# Patient Record
Sex: MALE | HISPANIC OR LATINO | Employment: FULL TIME | URBAN - METROPOLITAN AREA
[De-identification: names, ages, dates, MRNs, and addresses within clinical notes are randomized per-mention and may not be internally consistent; named-entity substitution may affect disease eponyms.]

---

## 2017-05-13 ENCOUNTER — HOSPITAL ENCOUNTER (OUTPATIENT)
Dept: LAB | Facility: MEDICAL CENTER | Age: 57
End: 2017-05-13
Attending: INTERNAL MEDICINE
Payer: COMMERCIAL

## 2017-05-13 DIAGNOSIS — Z13.220 SCREENING, LIPID: ICD-10-CM

## 2017-05-13 DIAGNOSIS — R73.01 ELEVATED FASTING GLUCOSE: ICD-10-CM

## 2017-05-13 LAB
ALBUMIN SERPL BCP-MCNC: 4.5 G/DL (ref 3.2–4.9)
ALBUMIN/GLOB SERPL: 1.5 G/DL
ALP SERPL-CCNC: 62 U/L (ref 30–99)
ALT SERPL-CCNC: 79 U/L (ref 2–50)
ANION GAP SERPL CALC-SCNC: 7 MMOL/L (ref 0–11.9)
AST SERPL-CCNC: 33 U/L (ref 12–45)
BILIRUB SERPL-MCNC: 0.8 MG/DL (ref 0.1–1.5)
BUN SERPL-MCNC: 14 MG/DL (ref 8–22)
CALCIUM SERPL-MCNC: 9.8 MG/DL (ref 8.5–10.5)
CHLORIDE SERPL-SCNC: 104 MMOL/L (ref 96–112)
CHOLEST SERPL-MCNC: 100 MG/DL (ref 100–199)
CO2 SERPL-SCNC: 25 MMOL/L (ref 20–33)
CREAT SERPL-MCNC: 0.89 MG/DL (ref 0.5–1.4)
EST. AVERAGE GLUCOSE BLD GHB EST-MCNC: 123 MG/DL
GFR SERPL CREATININE-BSD FRML MDRD: >60 ML/MIN/1.73 M 2
GLOBULIN SER CALC-MCNC: 3.1 G/DL (ref 1.9–3.5)
GLUCOSE SERPL-MCNC: 128 MG/DL (ref 65–99)
HBA1C MFR BLD: 5.9 % (ref 0–5.6)
HDLC SERPL-MCNC: 55 MG/DL
LDLC SERPL CALC-MCNC: 38 MG/DL
POTASSIUM SERPL-SCNC: 4 MMOL/L (ref 3.6–5.5)
PROT SERPL-MCNC: 7.6 G/DL (ref 6–8.2)
SODIUM SERPL-SCNC: 136 MMOL/L (ref 135–145)
TRIGL SERPL-MCNC: 35 MG/DL (ref 0–149)

## 2017-05-13 PROCEDURE — 80053 COMPREHEN METABOLIC PANEL: CPT

## 2017-05-13 PROCEDURE — 83036 HEMOGLOBIN GLYCOSYLATED A1C: CPT

## 2017-05-13 PROCEDURE — 80061 LIPID PANEL: CPT

## 2017-05-13 PROCEDURE — 36415 COLL VENOUS BLD VENIPUNCTURE: CPT

## 2018-05-23 ENCOUNTER — OFFICE VISIT (OUTPATIENT)
Dept: MEDICAL GROUP | Facility: MEDICAL CENTER | Age: 58
End: 2018-05-23
Payer: COMMERCIAL

## 2018-05-23 VITALS
TEMPERATURE: 98.6 F | RESPIRATION RATE: 16 BRPM | OXYGEN SATURATION: 94 % | BODY MASS INDEX: 33.33 KG/M2 | SYSTOLIC BLOOD PRESSURE: 116 MMHG | WEIGHT: 225 LBS | HEART RATE: 89 BPM | DIASTOLIC BLOOD PRESSURE: 70 MMHG | HEIGHT: 69 IN

## 2018-05-23 DIAGNOSIS — K75.81 NASH (NONALCOHOLIC STEATOHEPATITIS): ICD-10-CM

## 2018-05-23 DIAGNOSIS — R73.02 IGT (IMPAIRED GLUCOSE TOLERANCE): ICD-10-CM

## 2018-05-23 PROCEDURE — 99214 OFFICE O/P EST MOD 30 MIN: CPT | Performed by: INTERNAL MEDICINE

## 2018-05-23 ASSESSMENT — PATIENT HEALTH QUESTIONNAIRE - PHQ9: CLINICAL INTERPRETATION OF PHQ2 SCORE: 0

## 2018-05-24 NOTE — PROGRESS NOTES
"CC: Follow-up blood sugar    HPI:   Gustavo presents today with the following.    1. IGT (impaired glucose tolerance)  Presents with a strong family history of diabetes he has not had his blood work checked in some time.  He is contemplating becoming a kidney donor for his brother who is diabetic nephropathy.  He is now on dialysis.  Patient has very poor compliance record in terms of follow-up and checking his own blood sugars at least once a year.    2. BMI 33.0-33.9,adult  Persistently elevated although it is down from his last visit.    3. JACOBO (nonalcoholic steatohepatitis)  LFTs slightly elevated related to fatty liver disease he is coming due for repeat blood work.      Patient Active Problem List    Diagnosis Date Noted   • IGT (impaired glucose tolerance) 05/23/2018   • BMI 33.0-33.9,adult 11/11/2014   • Premature ventricular contractions 01/09/2012   • JACOBO (nonalcoholic steatohepatitis) 11/09/2009       Current Outpatient Prescriptions   Medication Sig Dispense Refill   • sildenafil citrate (VIAGRA) 100 MG tablet Take 1 Tab by mouth as needed for Erectile Dysfunction. 10 Tab 3   • EPINEPHrine (EPIPEN 2-ZAKIA) 0.3 MG/0.3ML NICO 1 PEN by Injection route every day. 2 Device 1     No current facility-administered medications for this visit.          Allergies as of 05/23/2018 - Reviewed 05/23/2018   Allergen Reaction Noted   • Food  05/22/2012        ROS: Denies Chest pain, SOB, LE edema.    /70   Pulse 89   Temp 37 °C (98.6 °F)   Resp 16   Ht 1.753 m (5' 9\")   Wt 102.1 kg (225 lb)   SpO2 94%   BMI 33.23 kg/m²     Physical Exam:  Gen:         Alert and oriented, No apparent distress.  Neck:        No Lymphadenopathy or Bruits.  Lungs:     Clear to auscultation bilaterally  CV:          Regular rate and rhythm. No murmurs, rubs or gallops.               Ext:          No clubbing, cyanosis, edema.      Assessment and Plan.   57 y.o. male with the following issues.    1. IGT (impaired glucose " tolerance)  Discussion about diet exercise rechecking blood work see back in 3 weeks.  - COMP METABOLIC PANEL; Future  - LIPID PROFILE; Future  - HEMOGLOBIN A1C; Future  - MICROALBUMIN CREAT RATIO URINE; Future    2. BMI 33.0-33.9,adult  Patient's body mass index is 33.23 kg/m². Exercise and nutrition counseling were performed at this visit.  Set goal of 15lbs in next 3 months.    - Patient identified as having weight management issue.  Appropriate orders and counseling given.    3. JACOBO (nonalcoholic steatohepatitis)  Rechecking LFTs

## 2018-05-26 ENCOUNTER — HOSPITAL ENCOUNTER (OUTPATIENT)
Dept: LAB | Facility: MEDICAL CENTER | Age: 58
End: 2018-05-26
Attending: INTERNAL MEDICINE
Payer: COMMERCIAL

## 2018-05-26 DIAGNOSIS — K75.81 NASH (NONALCOHOLIC STEATOHEPATITIS): ICD-10-CM

## 2018-05-26 DIAGNOSIS — R73.02 IGT (IMPAIRED GLUCOSE TOLERANCE): ICD-10-CM

## 2018-05-26 LAB
ALBUMIN SERPL BCP-MCNC: 4.4 G/DL (ref 3.2–4.9)
ALBUMIN/GLOB SERPL: 1.4 G/DL
ALP SERPL-CCNC: 60 U/L (ref 30–99)
ALT SERPL-CCNC: 63 U/L (ref 2–50)
ANION GAP SERPL CALC-SCNC: 7 MMOL/L (ref 0–11.9)
AST SERPL-CCNC: 29 U/L (ref 12–45)
BILIRUB SERPL-MCNC: 0.9 MG/DL (ref 0.1–1.5)
BUN SERPL-MCNC: 11 MG/DL (ref 8–22)
CALCIUM SERPL-MCNC: 9.9 MG/DL (ref 8.5–10.5)
CHLORIDE SERPL-SCNC: 103 MMOL/L (ref 96–112)
CHOLEST SERPL-MCNC: 98 MG/DL (ref 100–199)
CO2 SERPL-SCNC: 27 MMOL/L (ref 20–33)
CREAT SERPL-MCNC: 0.87 MG/DL (ref 0.5–1.4)
CREAT UR-MCNC: 89.9 MG/DL
EST. AVERAGE GLUCOSE BLD GHB EST-MCNC: 134 MG/DL
GLOBULIN SER CALC-MCNC: 3.2 G/DL (ref 1.9–3.5)
GLUCOSE SERPL-MCNC: 126 MG/DL (ref 65–99)
HBA1C MFR BLD: 6.3 % (ref 0–5.6)
HCV AB SER QL: NEGATIVE
HDLC SERPL-MCNC: 49 MG/DL
LDLC SERPL CALC-MCNC: 40 MG/DL
MICROALBUMIN UR-MCNC: <0.7 MG/DL
MICROALBUMIN/CREAT UR: NORMAL MG/G (ref 0–30)
POTASSIUM SERPL-SCNC: 4.7 MMOL/L (ref 3.6–5.5)
PROT SERPL-MCNC: 7.6 G/DL (ref 6–8.2)
SODIUM SERPL-SCNC: 137 MMOL/L (ref 135–145)
TRIGL SERPL-MCNC: 44 MG/DL (ref 0–149)

## 2018-05-26 PROCEDURE — 86803 HEPATITIS C AB TEST: CPT

## 2018-05-26 PROCEDURE — 80061 LIPID PANEL: CPT

## 2018-05-26 PROCEDURE — 36415 COLL VENOUS BLD VENIPUNCTURE: CPT

## 2018-05-26 PROCEDURE — 80053 COMPREHEN METABOLIC PANEL: CPT

## 2018-05-26 PROCEDURE — 82043 UR ALBUMIN QUANTITATIVE: CPT

## 2018-05-26 PROCEDURE — 82570 ASSAY OF URINE CREATININE: CPT

## 2018-05-26 PROCEDURE — 83036 HEMOGLOBIN GLYCOSYLATED A1C: CPT

## 2018-06-13 ENCOUNTER — OFFICE VISIT (OUTPATIENT)
Dept: MEDICAL GROUP | Facility: MEDICAL CENTER | Age: 58
End: 2018-06-13
Payer: COMMERCIAL

## 2018-06-13 VITALS
DIASTOLIC BLOOD PRESSURE: 68 MMHG | TEMPERATURE: 98.5 F | HEIGHT: 69 IN | RESPIRATION RATE: 16 BRPM | HEART RATE: 79 BPM | OXYGEN SATURATION: 98 % | WEIGHT: 222.4 LBS | SYSTOLIC BLOOD PRESSURE: 116 MMHG | BODY MASS INDEX: 32.94 KG/M2

## 2018-06-13 DIAGNOSIS — Z12.11 SCREEN FOR COLON CANCER: ICD-10-CM

## 2018-06-13 DIAGNOSIS — R73.02 IGT (IMPAIRED GLUCOSE TOLERANCE): ICD-10-CM

## 2018-06-13 PROCEDURE — 99215 OFFICE O/P EST HI 40 MIN: CPT | Performed by: INTERNAL MEDICINE

## 2018-06-13 RX ORDER — METFORMIN HYDROCHLORIDE 500 MG/1
500 TABLET, EXTENDED RELEASE ORAL DAILY
Qty: 180 TAB | Refills: 6 | Status: SHIPPED | OUTPATIENT
Start: 2018-06-13 | End: 2019-11-11 | Stop reason: SDUPTHER

## 2018-06-14 NOTE — PROGRESS NOTES
"CC: Follow-up elevated blood sugars.    HPI:   Gustavo presents today with the following.    1. IGT (impaired glucose tolerance)  Presents after having blood work A1c comes back at 6.3 with a family history of diabetes.  He denies excessive thirst or urination.  He does have a brother who is about to go through transplant secondary to diabetes.    2. BMI 33.0-33.9,adult  Weight persistently elevated has gone down slightly since last visit.  Wife does the cooking he is not been particularly vigilant about his dietary intake.          Patient Active Problem List    Diagnosis Date Noted   • IGT (impaired glucose tolerance) 05/23/2018   • BMI 33.0-33.9,adult 11/11/2014   • Premature ventricular contractions 01/09/2012   • JACOBO (nonalcoholic steatohepatitis) 11/09/2009       Current Outpatient Prescriptions   Medication Sig Dispense Refill   • metFORMIN ER (GLUCOPHAGE XR) 500 MG TABLET SR 24 HR Take 1 Tab by mouth every day. 180 Tab 6   • sildenafil citrate (VIAGRA) 100 MG tablet Take 1 Tab by mouth as needed for Erectile Dysfunction. 10 Tab 3   • EPINEPHrine (EPIPEN 2-ZAKIA) 0.3 MG/0.3ML NICO 1 PEN by Injection route every day. 2 Device 1     No current facility-administered medications for this visit.          Allergies as of 06/13/2018 - Reviewed 06/13/2018   Allergen Reaction Noted   • Food  05/22/2012        ROS: Denies Chest pain, SOB, LE edema.    /68   Pulse 79   Temp 36.9 °C (98.5 °F)   Resp 16   Ht 1.753 m (5' 9\")   Wt 100.9 kg (222 lb 6.4 oz)   SpO2 98%   BMI 32.84 kg/m²     Physical Exam:  Gen:         Alert and oriented, No apparent distress.  Neck:        No Lymphadenopathy or Bruits.  Lungs:     Clear to auscultation bilaterally  CV:          Regular rate and rhythm. No murmurs, rubs or gallops.               Ext:          No clubbing, cyanosis, edema.      Assessment and Plan.   57 y.o. male with the following issues.    1. IGT (impaired glucose tolerance)  Long discussion today about eating for " diabetes have started on metformin 500 twice daily Will see back in 3 months.  - metFORMIN ER (GLUCOPHAGE XR) 500 MG TABLET SR 24 HR; Take 1 Tab by mouth every day.  Dispense: 180 Tab; Refill: 6    2. BMI 33.0-33.9,adult  Long discussion today about diet exercise and weight loss strategies will see back in 3 months with a goal of 10 pounds of weight loss.    3. Screen for colon cancer    - REFERRAL TO GASTROENTEROLOGY    Refilled medications for the next 3 months we'll followup at that time. Pain is currently stable without new symptomology.  Controlled substance report reviewed and medications were deemed to be medically needed.    Over 40 minutes was spent with the patient of which over 50% of the time was spent with face-to-face patient education and care coordination.

## 2018-07-25 ENCOUNTER — HOSPITAL ENCOUNTER (OUTPATIENT)
Dept: RADIOLOGY | Facility: MEDICAL CENTER | Age: 58
End: 2018-07-25
Attending: NURSE PRACTITIONER
Payer: COMMERCIAL

## 2018-07-25 ENCOUNTER — OFFICE VISIT (OUTPATIENT)
Dept: URGENT CARE | Facility: PHYSICIAN GROUP | Age: 58
End: 2018-07-25
Payer: COMMERCIAL

## 2018-07-25 VITALS
RESPIRATION RATE: 16 BRPM | OXYGEN SATURATION: 96 % | WEIGHT: 210 LBS | BODY MASS INDEX: 31.1 KG/M2 | DIASTOLIC BLOOD PRESSURE: 92 MMHG | HEART RATE: 89 BPM | HEIGHT: 69 IN | SYSTOLIC BLOOD PRESSURE: 142 MMHG

## 2018-07-25 DIAGNOSIS — M25.561 ACUTE PAIN OF RIGHT KNEE: ICD-10-CM
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
## 2018-07-25 PROCEDURE — 73562 X-RAY EXAM OF KNEE 3: CPT | Mod: RT

## 2018-07-25 PROCEDURE — 99214 OFFICE O/P EST MOD 30 MIN: CPT | Performed by: NURSE PRACTITIONER

## 2018-07-26 ASSESSMENT — ENCOUNTER SYMPTOMS
FEVER: 0
SENSORY CHANGE: 0
TINGLING: 0
BACK PAIN: 0
CHILLS: 0

## 2018-07-26 ASSESSMENT — LIFESTYLE VARIABLES: SUBSTANCE_ABUSE: 0

## 2018-07-26 NOTE — PATIENT INSTRUCTIONS
Combined Knee Ligament Sprain  Introduction  A ligament is a type of fibrous tissue that connects one bone to another bone. There are four ligaments in your knee. Together, they provide stability for your knee joint. A combined knee ligament sprain is an injury in which more than one knee ligament is severely stretched or torn. This kind of injury is also called an injury to multiple structures of the knee.  What are the causes?  This condition may be caused by:  · A direct hit (trauma) to the knee.  · Overextending the knee.  · Twisting the knee.  What increases the risk?  This condition is more likely to develop in people who:  · Participate in certain sports, including:  ¨ Contact sports, such as football, rugby, and lacrosse.  ¨ Sports that take place on uneven ground, such as soccer and cross country.  ¨ Sports that involve quick changes in position, such as basketball, dancing, gymnastics, and skiing.  · Have poor strength or flexibility.  · Are overweight.  · Have overly flexible joints (joint laxity).  What are the signs or symptoms?  Symptoms of this condition include:  · Swelling.  · Pain with movement.  · Pain when the injured area is touched.  · Instability.  · A popping sound that happens at the time of injury.  · Inability to stand or use the injured knee to support (bear) one's body weight.  How is this diagnosed?  This condition is usually diagnosed with a physical exam. You may also have imaging tests, such as:  · An X-ray.  · MRI.  How is this treated?  This condition may be treated with:  · Ice applied to the affected area.  · Medicines for pain.  · Placing the knee in a brace or splint to prevent movement.  · Physical therapy to help strengthen and stabilize the knee joint.  · Surgery to reconstruct a torn ligament. This may be done in severe cases.  Follow these instructions at home:  If you have a splint or brace:  · Wear the splint or brace as told by your health care provider. Remove it only  as told by your health care provider.  · Loosen the splint or brace if your toes become numb and tingle, or if they turn cold and blue.  · Follow instructions from your health care provider about how to care for your splint or brace.  · Keep the splint or brace clean.  Managing pain, stiffness, and swelling  · If directed, apply ice to the injured area.  ¨ Put ice in a plastic bag.  ¨ Place a towel between your skin and the bag.  ¨ Leave the ice on for 20 minutes, 2-3 times per day.  · Move your toes often to avoid stiffness and to lessen swelling.  · Raise (elevate) the injured area above the level of your heart while you are sitting or lying down.  Driving  · Do not drive or operate heavy machinery while taking prescription pain medicine.  · Ask your health care provider when it is safe to drive if you have a splint or brace on your knee.  Activity  · Return to your normal activities as told by your health care provider. Ask your health care provider what activities are safe for you.  · Perform exercises daily as told by your health care provider or physical therapist.  General instructions  · Take over-the-counter and prescription medicines only as told by your health care provider.  · Do not take baths, swim, or use a hot tub until your health care provider approves. Ask your health care provider if you can take showers. You may only be allowed to take sponge baths for bathing.  · Do not use the injured limb to support your body weight until your health care provider says that you can.  · Keep all follow-up visits as told by your health care provider. This is important.  Contact a health care provider if:  · Your symptoms do not improve.  · Your symptoms get worse.  · You develop tingling or numbness in the area of your injury.  Get help right away if:  · You develop severe numbness or tingling in your leg or foot.  · Your foot turns blue, white, or gray, and it feels cold.  This information is not intended to  replace advice given to you by your health care provider. Make sure you discuss any questions you have with your health care provider.  Document Released: 12/18/2006 Document Revised: 05/25/2017 Document Reviewed: 02/19/2016  © 2017 [employer]

## 2018-07-26 NOTE — PROGRESS NOTES
"Subjective:      Gustavo Abdalla is a 57 y.o. male who presents with Knee Pain (right knee pain x2-3 weeks)          Denies past medical, surgical or family history that is significant to today's problem.   RX or OTC medications reviewed with patient today.   Allergies   Allergen Reactions   • Food      Pistachio nuts         HPI  This is a new problem. C/o Right knee pain following a mis-step off of a ladder ( 2 steps) approx 18 days ago. He think he hyperextended his knee to break his fall. Immediate pain in his knee but he was able to walk back into the house. Since that time it has continued to hurt him. He has used ice, heat, rest , elevation and a knee brace. Pain does not seem to be improving. Pain 3-6/ 10. Movements make the pain worse. Pain is sharp to dull ache depending on his activity. Climbing stairs is very difficult. No other aggravating or alleviating factors.       Review of Systems   Constitutional: Negative for chills, fever and malaise/fatigue.   Musculoskeletal: Negative for back pain.   Neurological: Negative for tingling and sensory change.   Psychiatric/Behavioral: Negative for substance abuse.          Objective:     /92   Pulse 89   Resp 16   Ht 1.753 m (5' 9\")   Wt 95.3 kg (210 lb)   SpO2 96%   BMI 31.01 kg/m²      Physical Exam   Constitutional: He is oriented to person, place, and time. He appears well-developed and well-nourished.   HENT:   Head: Normocephalic and atraumatic.   Neck: Normal range of motion.   Cardiovascular: Normal rate, regular rhythm and normal heart sounds.    Pulmonary/Chest: Effort normal and breath sounds normal.   Musculoskeletal:        Right knee: He exhibits decreased range of motion, swelling and effusion. He exhibits no ecchymosis, no deformity, no laceration, no erythema, normal alignment, no LCL laxity, normal patellar mobility, no bony tenderness, normal meniscus and no MCL laxity. Tenderness found. Medial joint line and MCL tenderness noted. "        Legs:  Neurological: He is alert and oriented to person, place, and time. He has normal strength. No sensory deficit. He displays a negative Romberg sign. Gait (only slightly antalgic) abnormal.   Skin: Skin is warm and dry.   Psychiatric: He has a normal mood and affect. His behavior is normal. Judgment and thought content normal.   Nursing note and vitals reviewed.           7/25/2018 5:53 PM    HISTORY/REASON FOR EXAM:  Fall from ladder with right knee pain      TECHNIQUE/EXAM DESCRIPTION AND NUMBER OF VIEWS:  3 views of the RIGHT knee.    COMPARISON: None    FINDINGS:      There is a small joint effusion. There is anterior soft tissue swelling.    There is no evidence of displaced  fracture or dislocation.    There is mild degenerative calcification in both medial and lateral meniscus. There is minimal spurring in the patellofemoral compartment and medial compartment.   Impression       1.  There is no acute fracture of the right knee.  2.  There is a small joint effusion with anterior soft tissue swelling.  3.  There is minimal degenerative spurring and there is calcification of both menisci, likely degenerative change.   Reading Provider Reading Date   Dyan Fuentes M.D. Jul 25, 2018          Assessment/Plan:     1. Acute pain of right knee  DX-KNEE 3 VIEWS RIGHT    REFERRAL TO ORTHOPEDICS   2. Fall from ladder, initial encounter  DX-KNEE 3 VIEWS RIGHT    REFERRAL TO ORTHOPEDICS     FU orthopedic  Knee brace prn for support and pain relief.   OTC age appropriate, analgesic of choice. such as acetaminophen (Ex brand name: Tylenol), ibuprofen (Ex brand names: Advil, Motrin), or naproxen   (Ex brand names: Aleve, Naprosyn).  Follow manufactures dosing and safety precautions.

## 2018-08-17 ENCOUNTER — HOSPITAL ENCOUNTER (OUTPATIENT)
Dept: RADIOLOGY | Facility: MEDICAL CENTER | Age: 58
End: 2018-08-17
Attending: ORTHOPAEDIC SURGERY
Payer: COMMERCIAL

## 2018-08-17 DIAGNOSIS — M25.561 RIGHT KNEE PAIN, UNSPECIFIED CHRONICITY: ICD-10-CM

## 2018-08-17 PROCEDURE — 73721 MRI JNT OF LWR EXTRE W/O DYE: CPT | Mod: RT

## 2018-09-12 ENCOUNTER — OFFICE VISIT (OUTPATIENT)
Dept: MEDICAL GROUP | Facility: MEDICAL CENTER | Age: 58
End: 2018-09-12
Payer: COMMERCIAL

## 2018-09-12 VITALS
RESPIRATION RATE: 16 BRPM | HEART RATE: 85 BPM | HEIGHT: 69 IN | SYSTOLIC BLOOD PRESSURE: 124 MMHG | TEMPERATURE: 98 F | BODY MASS INDEX: 32.73 KG/M2 | DIASTOLIC BLOOD PRESSURE: 72 MMHG | OXYGEN SATURATION: 95 % | WEIGHT: 221 LBS

## 2018-09-12 DIAGNOSIS — G89.29 CHRONIC PAIN OF RIGHT KNEE: ICD-10-CM

## 2018-09-12 DIAGNOSIS — M25.561 CHRONIC PAIN OF RIGHT KNEE: ICD-10-CM

## 2018-09-12 DIAGNOSIS — R73.02 IGT (IMPAIRED GLUCOSE TOLERANCE): ICD-10-CM

## 2018-09-12 PROCEDURE — 99213 OFFICE O/P EST LOW 20 MIN: CPT | Performed by: INTERNAL MEDICINE

## 2018-09-13 NOTE — PROGRESS NOTES
"CC: Follow-up knee pain, blood sugars.    HPI:   Gustavo presents today with the following.    1. Chronic pain of right knee  Resents after being seen by urgent care and then orthopedics with a complex tear to his meniscus.  He has postponed procedures as his knee is feeling slightly better but the orthopedist was concerned that if it gets any more torn may be in store for more significant surgery.    2. IGT (impaired glucose tolerance)  He is compliant with metformin denying any true side effects.  Not checking blood sugars never truly been diabetic.  Weight has remained neutral as he has not been able to be active but he has been cognizant of his diet.      Patient Active Problem List    Diagnosis Date Noted   • Chronic pain of right knee 09/12/2018   • IGT (impaired glucose tolerance) 05/23/2018   • BMI 33.0-33.9,adult 11/11/2014   • Premature ventricular contractions 01/09/2012   • JACOBO (nonalcoholic steatohepatitis) 11/09/2009       Current Outpatient Prescriptions   Medication Sig Dispense Refill   • metFORMIN ER (GLUCOPHAGE XR) 500 MG TABLET SR 24 HR Take 1 Tab by mouth every day. 180 Tab 6   • sildenafil citrate (VIAGRA) 100 MG tablet Take 1 Tab by mouth as needed for Erectile Dysfunction. 10 Tab 3   • EPINEPHrine (EPIPEN 2-ZAKIA) 0.3 MG/0.3ML NICO 1 PEN by Injection route every day. 2 Device 1     No current facility-administered medications for this visit.          Allergies as of 09/12/2018 - Reviewed 09/12/2018   Allergen Reaction Noted   • Food  05/22/2012        ROS: Denies Chest pain, SOB, LE edema.    /72   Pulse 85   Temp 36.7 °C (98 °F)   Resp 16   Ht 1.75 m (5' 8.9\")   Wt 100.2 kg (221 lb)   SpO2 95%   BMI 32.73 kg/m²     Physical Exam:  Gen:         Alert and oriented, No apparent distress.  Neck:        No Lymphadenopathy or Bruits.  Lungs:     Clear to auscultation bilaterally  CV:          Regular rate and rhythm. No murmurs, rubs or gallops.               Ext:          No clubbing, " cyanosis, edema.      Assessment and Plan.   58 y.o. male with the following issues.    1. Chronic pain of right knee  Encouraged him to follow-up with orthopedics for procedure.    2. IGT (impaired glucose tolerance)  Recheck blood work in 3 months follow-up  in 6 months unless blood work has worsened.  - COMP METABOLIC PANEL; Future  - HEMOGLOBIN A1C; Future

## 2018-11-02 ENCOUNTER — APPOINTMENT (OUTPATIENT)
Dept: ADMISSIONS | Facility: MEDICAL CENTER | Age: 58
End: 2018-11-02
Attending: ORTHOPAEDIC SURGERY
Payer: COMMERCIAL

## 2018-11-19 ENCOUNTER — HOSPITAL ENCOUNTER (OUTPATIENT)
Facility: MEDICAL CENTER | Age: 58
End: 2018-11-19
Attending: ORTHOPAEDIC SURGERY | Admitting: ORTHOPAEDIC SURGERY
Payer: COMMERCIAL

## 2018-11-19 VITALS
BODY MASS INDEX: 31.61 KG/M2 | SYSTOLIC BLOOD PRESSURE: 111 MMHG | RESPIRATION RATE: 16 BRPM | HEART RATE: 63 BPM | OXYGEN SATURATION: 94 % | WEIGHT: 213.41 LBS | DIASTOLIC BLOOD PRESSURE: 79 MMHG | TEMPERATURE: 96.9 F | HEIGHT: 69 IN

## 2018-11-19 PROBLEM — M23.203 OLD COMPLEX TEAR OF MEDIAL MENISCUS OF RIGHT KNEE: Status: ACTIVE | Noted: 2018-11-19

## 2018-11-19 LAB
ANION GAP SERPL CALC-SCNC: 7 MMOL/L (ref 0–11.9)
BUN SERPL-MCNC: 11 MG/DL (ref 8–22)
CALCIUM SERPL-MCNC: 9.8 MG/DL (ref 8.5–10.5)
CHLORIDE SERPL-SCNC: 107 MMOL/L (ref 96–112)
CO2 SERPL-SCNC: 24 MMOL/L (ref 20–33)
CREAT SERPL-MCNC: 0.75 MG/DL (ref 0.5–1.4)
EKG IMPRESSION: NORMAL
ERYTHROCYTE [DISTWIDTH] IN BLOOD BY AUTOMATED COUNT: 39.2 FL (ref 35.9–50)
GLUCOSE BLD-MCNC: 108 MG/DL (ref 65–99)
GLUCOSE SERPL-MCNC: 115 MG/DL (ref 65–99)
HCT VFR BLD AUTO: 47 % (ref 42–52)
HGB BLD-MCNC: 16.6 G/DL (ref 14–18)
MCH RBC QN AUTO: 29.3 PG (ref 27–33)
MCHC RBC AUTO-ENTMCNC: 35.3 G/DL (ref 33.7–35.3)
MCV RBC AUTO: 82.9 FL (ref 81.4–97.8)
PLATELET # BLD AUTO: 212 K/UL (ref 164–446)
PMV BLD AUTO: 9.3 FL (ref 9–12.9)
POTASSIUM SERPL-SCNC: 4.1 MMOL/L (ref 3.6–5.5)
RBC # BLD AUTO: 5.67 M/UL (ref 4.7–6.1)
SODIUM SERPL-SCNC: 138 MMOL/L (ref 135–145)
WBC # BLD AUTO: 6.2 K/UL (ref 4.8–10.8)

## 2018-11-19 PROCEDURE — 700102 HCHG RX REV CODE 250 W/ 637 OVERRIDE(OP): Performed by: ANESTHESIOLOGY

## 2018-11-19 PROCEDURE — 160046 HCHG PACU - 1ST 60 MINS PHASE II: Performed by: ORTHOPAEDIC SURGERY

## 2018-11-19 PROCEDURE — 160009 HCHG ANES TIME/MIN: Performed by: ORTHOPAEDIC SURGERY

## 2018-11-19 PROCEDURE — 93010 ELECTROCARDIOGRAM REPORT: CPT | Performed by: INTERNAL MEDICINE

## 2018-11-19 PROCEDURE — 160025 RECOVERY II MINUTES (STATS): Performed by: ORTHOPAEDIC SURGERY

## 2018-11-19 PROCEDURE — 160041 HCHG SURGERY MINUTES - EA ADDL 1 MIN LEVEL 4: Performed by: ORTHOPAEDIC SURGERY

## 2018-11-19 PROCEDURE — 500026 HCHG ARTHROWAND COVAC: Performed by: ORTHOPAEDIC SURGERY

## 2018-11-19 PROCEDURE — 700101 HCHG RX REV CODE 250

## 2018-11-19 PROCEDURE — 160002 HCHG RECOVERY MINUTES (STAT): Performed by: ORTHOPAEDIC SURGERY

## 2018-11-19 PROCEDURE — 85027 COMPLETE CBC AUTOMATED: CPT

## 2018-11-19 PROCEDURE — 501838 HCHG SUTURE GENERAL: Performed by: ORTHOPAEDIC SURGERY

## 2018-11-19 PROCEDURE — 160029 HCHG SURGERY MINUTES - 1ST 30 MINS LEVEL 4: Performed by: ORTHOPAEDIC SURGERY

## 2018-11-19 PROCEDURE — 93005 ELECTROCARDIOGRAM TRACING: CPT | Performed by: ORTHOPAEDIC SURGERY

## 2018-11-19 PROCEDURE — 501674 HCHG TUBING, PUMP 3M (ORTHO): Performed by: ORTHOPAEDIC SURGERY

## 2018-11-19 PROCEDURE — 160048 HCHG OR STATISTICAL LEVEL 1-5: Performed by: ORTHOPAEDIC SURGERY

## 2018-11-19 PROCEDURE — 160035 HCHG PACU - 1ST 60 MINS PHASE I: Performed by: ORTHOPAEDIC SURGERY

## 2018-11-19 PROCEDURE — 700111 HCHG RX REV CODE 636 W/ 250 OVERRIDE (IP)

## 2018-11-19 PROCEDURE — A6223 GAUZE >16<=48 NO W/SAL W/O B: HCPCS | Performed by: ORTHOPAEDIC SURGERY

## 2018-11-19 PROCEDURE — 500878 HCHG PACK, ARTHROSCOPY: Performed by: ORTHOPAEDIC SURGERY

## 2018-11-19 PROCEDURE — A9270 NON-COVERED ITEM OR SERVICE: HCPCS | Performed by: ANESTHESIOLOGY

## 2018-11-19 PROCEDURE — 80048 BASIC METABOLIC PNL TOTAL CA: CPT

## 2018-11-19 PROCEDURE — 82962 GLUCOSE BLOOD TEST: CPT

## 2018-11-19 PROCEDURE — 160036 HCHG PACU - EA ADDL 30 MINS PHASE I: Performed by: ORTHOPAEDIC SURGERY

## 2018-11-19 RX ORDER — CELECOXIB 200 MG/1
400 CAPSULE ORAL ONCE
Status: COMPLETED | OUTPATIENT
Start: 2018-11-19 | End: 2018-11-19

## 2018-11-19 RX ORDER — OXYCODONE HCL 5 MG/5 ML
10 SOLUTION, ORAL ORAL
Status: DISCONTINUED | OUTPATIENT
Start: 2018-11-19 | End: 2018-11-19 | Stop reason: HOSPADM

## 2018-11-19 RX ORDER — SODIUM CHLORIDE 9 MG/ML
INJECTION, SOLUTION INTRAVENOUS ONCE
Status: COMPLETED | OUTPATIENT
Start: 2018-11-19 | End: 2018-11-19

## 2018-11-19 RX ORDER — ROPIVACAINE HYDROCHLORIDE 5 MG/ML
INJECTION, SOLUTION EPIDURAL; INFILTRATION; PERINEURAL
Status: DISCONTINUED | OUTPATIENT
Start: 2018-11-19 | End: 2018-11-19 | Stop reason: HOSPADM

## 2018-11-19 RX ORDER — OXYCODONE HCL 5 MG/5 ML
5 SOLUTION, ORAL ORAL
Status: DISCONTINUED | OUTPATIENT
Start: 2018-11-19 | End: 2018-11-19 | Stop reason: HOSPADM

## 2018-11-19 RX ORDER — OXYCODONE HYDROCHLORIDE 5 MG/1
5 TABLET ORAL
Status: DISCONTINUED | OUTPATIENT
Start: 2018-11-19 | End: 2018-11-19 | Stop reason: HOSPADM

## 2018-11-19 RX ORDER — EPINEPHRINE 1 MG/ML(1)
AMPUL (ML) INJECTION
Status: DISCONTINUED | OUTPATIENT
Start: 2018-11-19 | End: 2018-11-19 | Stop reason: HOSPADM

## 2018-11-19 RX ORDER — HYDROMORPHONE HYDROCHLORIDE 1 MG/ML
0.2 INJECTION, SOLUTION INTRAMUSCULAR; INTRAVENOUS; SUBCUTANEOUS
Status: DISCONTINUED | OUTPATIENT
Start: 2018-11-19 | End: 2018-11-19 | Stop reason: HOSPADM

## 2018-11-19 RX ORDER — HALOPERIDOL 5 MG/ML
1 INJECTION INTRAMUSCULAR
Status: DISCONTINUED | OUTPATIENT
Start: 2018-11-19 | End: 2018-11-19 | Stop reason: HOSPADM

## 2018-11-19 RX ORDER — HYDROMORPHONE HYDROCHLORIDE 1 MG/ML
0.4 INJECTION, SOLUTION INTRAMUSCULAR; INTRAVENOUS; SUBCUTANEOUS
Status: DISCONTINUED | OUTPATIENT
Start: 2018-11-19 | End: 2018-11-19 | Stop reason: HOSPADM

## 2018-11-19 RX ORDER — MEPERIDINE HYDROCHLORIDE 25 MG/ML
12.5 INJECTION INTRAMUSCULAR; INTRAVENOUS; SUBCUTANEOUS
Status: DISCONTINUED | OUTPATIENT
Start: 2018-11-19 | End: 2018-11-19 | Stop reason: HOSPADM

## 2018-11-19 RX ORDER — SODIUM CHLORIDE, SODIUM LACTATE, POTASSIUM CHLORIDE, CALCIUM CHLORIDE 600; 310; 30; 20 MG/100ML; MG/100ML; MG/100ML; MG/100ML
INJECTION, SOLUTION INTRAVENOUS CONTINUOUS
Status: DISCONTINUED | OUTPATIENT
Start: 2018-11-19 | End: 2018-11-19 | Stop reason: HOSPADM

## 2018-11-19 RX ORDER — MIDAZOLAM HYDROCHLORIDE 1 MG/ML
1 INJECTION INTRAMUSCULAR; INTRAVENOUS
Status: DISCONTINUED | OUTPATIENT
Start: 2018-11-19 | End: 2018-11-19 | Stop reason: HOSPADM

## 2018-11-19 RX ORDER — ONDANSETRON 2 MG/ML
4 INJECTION INTRAMUSCULAR; INTRAVENOUS
Status: DISCONTINUED | OUTPATIENT
Start: 2018-11-19 | End: 2018-11-19 | Stop reason: HOSPADM

## 2018-11-19 RX ORDER — ACETAMINOPHEN 500 MG
1000 TABLET ORAL ONCE
Status: COMPLETED | OUTPATIENT
Start: 2018-11-19 | End: 2018-11-19

## 2018-11-19 RX ORDER — OXYCODONE HYDROCHLORIDE 5 MG/1
10 TABLET ORAL
Status: DISCONTINUED | OUTPATIENT
Start: 2018-11-19 | End: 2018-11-19 | Stop reason: HOSPADM

## 2018-11-19 RX ORDER — DIPHENHYDRAMINE HYDROCHLORIDE 50 MG/ML
12.5 INJECTION INTRAMUSCULAR; INTRAVENOUS
Status: DISCONTINUED | OUTPATIENT
Start: 2018-11-19 | End: 2018-11-19 | Stop reason: HOSPADM

## 2018-11-19 RX ORDER — GABAPENTIN 300 MG/1
300 CAPSULE ORAL ONCE
Status: COMPLETED | OUTPATIENT
Start: 2018-11-19 | End: 2018-11-19

## 2018-11-19 RX ORDER — MORPHINE SULFATE 1 MG/ML
INJECTION, SOLUTION EPIDURAL; INTRATHECAL; INTRAVENOUS
Status: DISCONTINUED | OUTPATIENT
Start: 2018-11-19 | End: 2018-11-19 | Stop reason: HOSPADM

## 2018-11-19 RX ORDER — MAGNESIUM HYDROXIDE 1200 MG/15ML
LIQUID ORAL
Status: COMPLETED | OUTPATIENT
Start: 2018-11-19 | End: 2018-11-19

## 2018-11-19 RX ORDER — LABETALOL HYDROCHLORIDE 5 MG/ML
5 INJECTION, SOLUTION INTRAVENOUS
Status: DISCONTINUED | OUTPATIENT
Start: 2018-11-19 | End: 2018-11-19 | Stop reason: HOSPADM

## 2018-11-19 RX ORDER — HYDROMORPHONE HYDROCHLORIDE 1 MG/ML
0.1 INJECTION, SOLUTION INTRAMUSCULAR; INTRAVENOUS; SUBCUTANEOUS
Status: DISCONTINUED | OUTPATIENT
Start: 2018-11-19 | End: 2018-11-19 | Stop reason: HOSPADM

## 2018-11-19 RX ORDER — METOPROLOL TARTRATE 1 MG/ML
1 INJECTION, SOLUTION INTRAVENOUS
Status: DISCONTINUED | OUTPATIENT
Start: 2018-11-19 | End: 2018-11-19 | Stop reason: HOSPADM

## 2018-11-19 RX ORDER — HYDRALAZINE HYDROCHLORIDE 20 MG/ML
5 INJECTION INTRAMUSCULAR; INTRAVENOUS
Status: DISCONTINUED | OUTPATIENT
Start: 2018-11-19 | End: 2018-11-19 | Stop reason: HOSPADM

## 2018-11-19 RX ADMIN — GABAPENTIN 300 MG: 300 CAPSULE ORAL at 10:19

## 2018-11-19 RX ADMIN — ACETAMINOPHEN 1000 MG: 500 TABLET, FILM COATED ORAL at 10:19

## 2018-11-19 RX ADMIN — SODIUM CHLORIDE: 9 INJECTION, SOLUTION INTRAVENOUS at 10:20

## 2018-11-19 RX ADMIN — CELECOXIB 400 MG: 200 CAPSULE ORAL at 10:19

## 2018-11-19 ASSESSMENT — PAIN SCALES - GENERAL
PAINLEVEL_OUTOF10: 0

## 2018-11-19 NOTE — OR SURGEON
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    Anesthesiologist/Type of Anesthesia:  Anesthesiologist: Hayden Levine M.D./General    Surgical Staff:  Circulator: Rubi Guzmán R.N.  Scrub Person: Aviva Black    Specimens removed if any:  * No specimens in log *    Estimated Blood Loss: none    Findings: medial meniscus tear, plicae, chondromalacia    Complications: none        11/19/2018 11:58 AM Russel Corona M.D.

## 2018-11-19 NOTE — OP REPORT
DATE OF SERVICE:  11/19/2018    PREOPERATIVE DIAGNOSIS:  Chronic degenerative type tear, right medial   meniscus.    POSTOPERATIVE DIAGNOSES:  1.  Complex degenerative type tear, middle and posterior thirds, right medial   meniscus.  2.  Unstable grade III chondromalacia of the weightbearing surface of his   medial femoral condyle.  3.  Pathologic, thickened right medial parapatellar plica.    PROCEDURES PERFORMED:  Right knee arthroscopy with partial medial meniscectomy   including chondroplasty of the medial femoral condyle and excision of plica.    SURGEON:  Russel Corona MD    ANESTHESIA:  General.    ANESTHESIOLOGIST:  Hayden Levine MD    ESTIMATED BLOOD LOSS:  None measureable.    TOTAL TOURNIQUET TIME:  23 minutes.    FLUIDS:  Crystalloids only.    ANTIBIOTICS:  2 g of Kefzol preoperatively.    COMPLICATIONS:  None.    DESCRIPTION OF PROCEDURE:  While in preop holding, I answered all the   patient's and his wife's questions.  I placed my initials on the mid anterior   aspect of the right thigh.  After informed consent was given, patient was   brought back in the operating room, placed in the supine position, given a   satisfactory general anesthetic.  Confirmed with anesthesia that the   antibiotics have been given intravenously.  A well-padded tourniquet was   placed on the proximal right thigh.  Right knee exam revealed normal skin,   neutral alignment.  No effusion.  Passive range of motion is a trace of   hyperextension to 135 degrees of flexion and there is no clinical instability,   but slight increase in crepitus from the medial compartment with that   manipulation and motion.  Right lower extremity from the tourniquet to the   toes was then prepped and draped in the usual sterile fashion.  Formal   time-out was performed.  After exsanguination of the limb with an Esmarch,   tourniquet was then inflated to 250 mmHg of pressure.    From a lateral puncture, I injected his right knee with 60 mL  of normal   saline.  Standard anteromedial and anterolateral arthroscopy portals were then   established with #11 blade to the adjacent sides to the patellar tendon at   the level of joint line.  Scope was placed via the anterolateral portal into   the pouch and other instruments were placed through the anteromedial portal as   they were needed.    Systematic thorough examination of his right knee revealed a normal   suprapatellar pouch.  No significant synovitis.  No visible loose bodies.    Patient had a quite remarkably thickened medial parapatellar plica, basically   was articulating in that medial portion of the patellofemoral joint.  Just   mild morphologic changes on that anterior portion of the medial femoral   condyle corresponding to the mechanical contact of the plica with the condyle.    From the aspect of the lateral gutter, we could appreciate normal synovial   folds, no osteophytes, no visible loose bodies and the popliteus tendon   appeared normal.  Mild hypertrophy of the fat pad, which was generously   debrided back to improve visualization of the notch as well as the   patellofemoral joint.  Patellofemoral tracking was normal.  Patient did have   grade II chondromalacia of the patella manifested by low grade thinning and   fibrillation with some softening, but no true mechanically unstable patellar   cartilage flaps.  Femoral trochlear groove had some mild grade I   chondromalacia manifested by some discoloration and some softening, but a   stable surface mechanically.  I did go ahead and resect that thickened medial   plica utilizing the shaver.  There was a small thinner extension up to the   medial aspect of the suprapatellar pouch, I also went ahead and excised that   as well.    Exam of the intercondylar notch revealed no significant stenosis.  There was a   small ligamentum mucosum, which I went ahead and resected.  The anterior and   posterior cruciate ligaments were normal.  No synovitis  or loose bodies.    Intermeniscal ligament was normal.    Lateral compartment exam revealed a normal, stable, untorn lateral meniscus.    Normal articular cartilage of the lateral femoral condyle and there was kind   of a grade II area of chondromalacia longitudinally from anterior to posterior   at the junction between the medial third and lateral two-thirds of the   lateral tibial plateau.  Patient did have 2 focal areas of some mineralization   of the lateral meniscus, one area probably 4-5 mm in diameter located at the   junction between the anterior middle third.  There was also a little bit   calcification of the root of the posterior horn of the lateral meniscus, but   that meniscus was stable to probing without instability and without tear.    Popliteus tendon appeared normal.    Exam of the medial compartment revealed grade III chondromalacia involving the   weightbearing portion of the medial femoral condyle manifested by high grade,   but still partial thickness or areas of delamination and thinning.  One area   in particular was relatively unstable based on the posterior portion of the   weightbearing part and delaminating off the medial half of that condyle.  In   respect of the meniscus, the anterior horn of the medial meniscus was normal   and untorn, and the anterior half of the middle third was normal and untorn.    However, the posterior half of the middle third and the entire posterior horn   was ripped and shredded with a good parrot beak flap, flipped up behind the   medial femoral condyle, adjacent to that medial tibial spine.  There was a   little bit of a smaller parrot beak flap based upon the inferior surface at   the junction between the middle and posterior thirds.  The superior leaf of   the posterior horn was in relatively good shape.    I performed a partial medial meniscectomy utilizing a combination of the   straight duckbill punch and the aggressive meniscus shaver until that  residual   meniscus rim was smooth, stable and contoured.  I think the patient probably   has a little bit of a functional remaining posterior horn of the medial   meniscus.  I would estimate to remove 15-20% of the meniscus with that partial   meniscectomy.  Repeat probing confirmed that what I had left was smooth,   stable, contoured, unlikely to result in any mechanical symptoms or pain.  I   then spent a little bit of time doing a gentle chondroplasty of the unstable   portions of the articular cartilage of the medial femoral condyle.    Fortunately for the patient, no exposed subchondral bone, at least not yet.    The arthroscopic instruments were then removed from the knee.  The tourniquet   was deflated and the 2 incisions were closed with interrupted horizontal   mattress sutures of 3-0 nylon.  From a lateral puncture, his knee was injected   with 30 mL of 0.5% ropivacaine with 2 mg of morphine and a touch of   epinephrine.  A soft sterile bandage, Ace wrap, and thigh high AUGUSTO hose were   then applied.  Patient was then awakened in the operating room and transported   to the recovery room in stable condition.       ____________________________________     WATSON GAITAN MD    JKEN / LUPE    DD:  11/19/2018 12:08:56  DT:  11/19/2018 12:30:08    D#:  [id number]  Job#:  [id number]    cc: TR TITUS MD

## 2018-11-19 NOTE — DISCHARGE INSTRUCTIONS
ACTIVITY: Rest and take it easy for the first 24 hours.  A responsible adult is recommended to remain with you during that time.  It is normal to feel sleepy.  We encourage you to not do anything that requires balance, judgment or coordination.    MILD FLU-LIKE SYMPTOMS ARE NORMAL. YOU MAY EXPERIENCE GENERALIZED MUSCLE ACHES, THROAT IRRITATION, HEADACHE AND/OR SOME NAUSEA.    FOR 24 HOURS DO NOT:  Drive, operate machinery or run household appliances.  Drink beer or alcoholic beverages.   Make important decisions or sign legal documents.    SPECIAL INSTRUCTIONS:   Ambulate today, and three times daily starting 11/20/18 unless contraindicated  Weight bearing as tolerated  Shower with incision covered, ok to shower 11/19/18      DIET: To avoid nausea, slowly advance diet as tolerated, avoiding spicy or greasy foods for the first day.  Add more substantial food to your diet according to your physician's instructions.  Babies can be fed formula or breast milk as soon as they are hungry.  INCREASE FLUIDS AND FIBER TO AVOID CONSTIPATION.    SURGICAL DRESSING/BATHING: Shower with wound covered, do not submerge (bath, hot tub, etc.) until seen by your physician for follow up.    FOLLOW-UP APPOINTMENT:  A follow-up appointment should be arranged with your doctor in 1-2 weeks; call to schedule.    You should CALL YOUR PHYSICIAN if you develop:  Fever greater than 101 degrees F.  Pain not relieved by medication, or persistent nausea or vomiting.  Excessive bleeding (blood soaking through dressing) or unexpected drainage from the wound.  Extreme redness or swelling around the incision site, drainage of pus or foul smelling drainage.  Inability to urinate or empty your bladder within 8 hours.  Problems with breathing or chest pain.    You should call 911 if you develop problems with breathing or chest pain.  If you are unable to contact your doctor or surgical center, you should go to the nearest emergency room or urgent care  center.  Physician's telephone #: Dr. Corona: [phone]    If any questions arise, call your doctor.  If your doctor is not available, please feel free to call the Surgical Center at [phone].  The Center is open Monday through Friday from 7AM to 7PM.  You can also call the HEALTH HOTLINE open 24 hours/day, 7 days/week and speak to a nurse at [phone], or toll free at [phone].    A registered nurse may call you a few days after your surgery to see how you are doing after your procedure.    MEDICATIONS: Resume taking daily medication.  Take prescribed pain medication with food.  If no medication is prescribed, you may take non-aspirin pain medication if needed.  PAIN MEDICATION CAN BE VERY CONSTIPATING.  Take a stool softener or laxative such as senokot, pericolace, or milk of magnesia if needed.    Prescriptions previously given to patient.  Ok to take pain medication when needed, and as prescribed.    If your physician has prescribed pain medication that includes Acetaminophen (Tylenol), do not take additional Acetaminophen (Tylenol) while taking the prescribed medication.    Depression / Suicide Risk    As you are discharged from this [facility] facility, it is important to learn how to keep safe from harming yourself.    Recognize the warning signs:  · Abrupt changes in personality, positive or negative- including increase in energy   · Giving away possessions  · Change in eating patterns- significant weight changes-  positive or negative  · Change in sleeping patterns- unable to sleep or sleeping all the time   · Unwillingness or inability to communicate  · Depression  · Unusual sadness, discouragement and loneliness  · Talk of wanting to die  · Neglect of personal appearance   · Rebelliousness- reckless behavior  · Withdrawal from people/activities they love  · Confusion- inability to concentrate     If you or a loved one observes any of these behaviors or has concerns about self-harm,  here's what you can do:  · Talk about it- your feelings and reasons for harming yourself  · Remove any means that you might use to hurt yourself (examples: pills, rope, extension cords, firearm)  · Get professional help from the community (Mental Health, Substance Abuse, psychological counseling)  · Do not be alone:Call your Safe Contact- someone whom you trust who will be there for you.  · Call your local CRISIS HOTLINE 079-9516 or 182-297-8897  · Call your local Children's Mobile Crisis Response Team [place] (911) 253-7519 or www.ShopClues.com  · Call the toll free National Suicide Prevention Hotlines   · National Suicide Prevention Lifeline 899-842-QOHC (1534)  · National Hope Line Network 800-SUICIDE (473-6993)

## 2018-11-19 NOTE — OR NURSING
Pt AA/Ox4. VSS. Dressing to right knee, CDI. CMS+. Pt denies pain or need for pain medications at this time. Pt denies numbness or tingling. No nausea or vomiting. SCDs and AUGUSTO hose to RLE. Report given to DEANN Ruvalcaba.    Pt via son was transferred to PACU2 at 1251.

## 2018-11-19 NOTE — OR NURSING
Pt VSS, pain controlled, tolerating po intake. Pt and family given discharge education/instructions, all questions answered. IV discontinued, site wnl. Surgical site with bulky ace wrap wayneg, cdi, wnl. Geoff tolbert on. Pt discharged home in stable condition with all belongings.

## 2019-03-16 ENCOUNTER — HOSPITAL ENCOUNTER (OUTPATIENT)
Dept: LAB | Facility: MEDICAL CENTER | Age: 59
End: 2019-03-16
Attending: INTERNAL MEDICINE
Payer: COMMERCIAL

## 2019-03-16 DIAGNOSIS — R73.02 IGT (IMPAIRED GLUCOSE TOLERANCE): ICD-10-CM

## 2019-03-16 LAB
ALBUMIN SERPL BCP-MCNC: 4.5 G/DL (ref 3.2–4.9)
ALBUMIN/GLOB SERPL: 1.4 G/DL
ALP SERPL-CCNC: 57 U/L (ref 30–99)
ALT SERPL-CCNC: 72 U/L (ref 2–50)
ANION GAP SERPL CALC-SCNC: 6 MMOL/L (ref 0–11.9)
AST SERPL-CCNC: 36 U/L (ref 12–45)
BILIRUB SERPL-MCNC: 0.8 MG/DL (ref 0.1–1.5)
BUN SERPL-MCNC: 12 MG/DL (ref 8–22)
CALCIUM SERPL-MCNC: 9.3 MG/DL (ref 8.5–10.5)
CHLORIDE SERPL-SCNC: 106 MMOL/L (ref 96–112)
CO2 SERPL-SCNC: 27 MMOL/L (ref 20–33)
CREAT SERPL-MCNC: 0.88 MG/DL (ref 0.5–1.4)
EST. AVERAGE GLUCOSE BLD GHB EST-MCNC: 134 MG/DL
FASTING STATUS PATIENT QL REPORTED: NORMAL
GLOBULIN SER CALC-MCNC: 3.3 G/DL (ref 1.9–3.5)
GLUCOSE SERPL-MCNC: 122 MG/DL (ref 65–99)
HBA1C MFR BLD: 6.3 % (ref 0–5.6)
POTASSIUM SERPL-SCNC: 4.2 MMOL/L (ref 3.6–5.5)
PROT SERPL-MCNC: 7.8 G/DL (ref 6–8.2)
SODIUM SERPL-SCNC: 139 MMOL/L (ref 135–145)

## 2019-03-16 PROCEDURE — 83036 HEMOGLOBIN GLYCOSYLATED A1C: CPT

## 2019-03-16 PROCEDURE — 80053 COMPREHEN METABOLIC PANEL: CPT

## 2019-03-16 PROCEDURE — 36415 COLL VENOUS BLD VENIPUNCTURE: CPT

## 2019-03-20 ENCOUNTER — OFFICE VISIT (OUTPATIENT)
Dept: MEDICAL GROUP | Facility: MEDICAL CENTER | Age: 59
End: 2019-03-20
Payer: COMMERCIAL

## 2019-03-20 VITALS
HEART RATE: 87 BPM | BODY MASS INDEX: 32.29 KG/M2 | SYSTOLIC BLOOD PRESSURE: 124 MMHG | TEMPERATURE: 97.1 F | WEIGHT: 218 LBS | HEIGHT: 69 IN | DIASTOLIC BLOOD PRESSURE: 82 MMHG | OXYGEN SATURATION: 98 %

## 2019-03-20 DIAGNOSIS — Z12.11 SCREEN FOR COLON CANCER: ICD-10-CM

## 2019-03-20 DIAGNOSIS — R73.02 IGT (IMPAIRED GLUCOSE TOLERANCE): ICD-10-CM

## 2019-03-20 PROCEDURE — 99213 OFFICE O/P EST LOW 20 MIN: CPT | Performed by: INTERNAL MEDICINE

## 2019-03-21 NOTE — PROGRESS NOTES
"CC: Follow-up blood sugars, BMI.    HPI:   Gustavo presents today with the following.    1. IGT (impaired glucose tolerance)  Presents after having blood work A1c stable at 6.3 weight is roughly the same as it was 6 months ago.  He does have a family history of diabetes he is trying to be mindful of diet and exercise.    2. BMI 33.0-33.9,adult  Persistently elevated again trying to work resources to bring it down.    3. Screen for colon cancer        Patient Active Problem List    Diagnosis Date Noted   • Old complex tear of medial meniscus of right knee 11/19/2018   • Chronic pain of right knee 09/12/2018   • IGT (impaired glucose tolerance) 05/23/2018   • BMI 33.0-33.9,adult 11/11/2014   • Premature ventricular contractions 01/09/2012   • JACOBO (nonalcoholic steatohepatitis) 11/09/2009       Current Outpatient Prescriptions   Medication Sig Dispense Refill   • metFORMIN ER (GLUCOPHAGE XR) 500 MG TABLET SR 24 HR Take 1 Tab by mouth every day. 180 Tab 6     No current facility-administered medications for this visit.          Allergies as of 03/20/2019 - Reviewed 03/20/2019   Allergen Reaction Noted   • Food Anaphylaxis 05/22/2012        ROS: Denies Chest pain, SOB, LE edema.    /82 (BP Location: Right arm, Patient Position: Sitting)   Pulse 87   Temp 36.2 °C (97.1 °F)   Ht 1.753 m (5' 9\")   Wt 98.9 kg (218 lb)   SpO2 98%   BMI 32.19 kg/m²     Physical Exam:  Gen:         Alert and oriented, No apparent distress.  Neck:        No Lymphadenopathy or Bruits.  Lungs:     Clear to auscultation bilaterally  CV:          Regular rate and rhythm. No murmurs, rubs or gallops.               Ext:          No clubbing, cyanosis, edema.      Assessment and Plan.   58 y.o. male with the following issues.    1. IGT (impaired glucose tolerance)  Discussion about diet exercise weight loss recheck 6 months.    2. BMI 33.0-33.9,adult  Again discussion about weight loss strategies.    3. Screen for colon cancer    - REFERRAL " TO GASTROENTEROLOGY

## 2019-09-18 ENCOUNTER — OFFICE VISIT (OUTPATIENT)
Dept: MEDICAL GROUP | Facility: MEDICAL CENTER | Age: 59
End: 2019-09-18
Payer: COMMERCIAL

## 2019-09-18 VITALS
TEMPERATURE: 98 F | HEIGHT: 69 IN | HEART RATE: 73 BPM | OXYGEN SATURATION: 96 % | DIASTOLIC BLOOD PRESSURE: 70 MMHG | SYSTOLIC BLOOD PRESSURE: 112 MMHG | WEIGHT: 213 LBS | BODY MASS INDEX: 31.55 KG/M2

## 2019-09-18 DIAGNOSIS — Z12.11 SCREEN FOR COLON CANCER: ICD-10-CM

## 2019-09-18 DIAGNOSIS — K13.0 LIP LESION: ICD-10-CM

## 2019-09-18 DIAGNOSIS — Z13.6 SCREENING FOR CARDIOVASCULAR CONDITION: ICD-10-CM

## 2019-09-18 DIAGNOSIS — R73.02 IGT (IMPAIRED GLUCOSE TOLERANCE): ICD-10-CM

## 2019-09-18 DIAGNOSIS — E66.9 OBESITY (BMI 30-39.9): ICD-10-CM

## 2019-09-18 DIAGNOSIS — Z23 NEED FOR VACCINATION: ICD-10-CM

## 2019-09-18 DIAGNOSIS — Z12.5 SCREENING PSA (PROSTATE SPECIFIC ANTIGEN): ICD-10-CM

## 2019-09-18 LAB
HBA1C MFR BLD: 5.7 % (ref 0–5.6)
INT CON NEG: NEGATIVE
INT CON POS: POSITIVE

## 2019-09-18 PROCEDURE — 90471 IMMUNIZATION ADMIN: CPT | Performed by: INTERNAL MEDICINE

## 2019-09-18 PROCEDURE — 90715 TDAP VACCINE 7 YRS/> IM: CPT | Performed by: INTERNAL MEDICINE

## 2019-09-18 PROCEDURE — 99214 OFFICE O/P EST MOD 30 MIN: CPT | Mod: 25 | Performed by: INTERNAL MEDICINE

## 2019-09-18 PROCEDURE — 83036 HEMOGLOBIN GLYCOSYLATED A1C: CPT | Performed by: INTERNAL MEDICINE

## 2019-09-18 RX ORDER — SILDENAFIL 100 MG/1
100 TABLET, FILM COATED ORAL DAILY
Qty: 10 TAB | Refills: 11 | Status: SHIPPED | OUTPATIENT
Start: 2019-09-18 | End: 2020-10-14 | Stop reason: SDUPTHER

## 2019-09-18 ASSESSMENT — PATIENT HEALTH QUESTIONNAIRE - PHQ9: CLINICAL INTERPRETATION OF PHQ2 SCORE: 0

## 2019-09-19 NOTE — PROGRESS NOTES
"      CC: Follow-up blood sugars, obesity, lip lesion.                                                                                                                                      HPI:   Gustavo presents today with the following.    1. IGT (impaired glucose tolerance)  Presents due for A1c checked in office today found to be 5.7.  Has gone down from 6.3 tolerating metformin well.    2. Obesity (BMI 30-39.9)  Persistently elevated although he has managed to lose 5 pounds.    3. Lip lesion  Lesion on his lip been present for 4 months continuously cracking and a slight change to the skin around it.  He has had cold sores in the past but this is not resolving.      Patient Active Problem List    Diagnosis Date Noted   • Obesity (BMI 30-39.9) 09/18/2019   • Old complex tear of medial meniscus of right knee 11/19/2018   • Chronic pain of right knee 09/12/2018   • IGT (impaired glucose tolerance) 05/23/2018   • Premature ventricular contractions 01/09/2012   • JACOBO (nonalcoholic steatohepatitis) 11/09/2009       Current Outpatient Medications   Medication Sig Dispense Refill   • sildenafil citrate (VIAGRA) 100 MG tablet Take 1 Tab by mouth every day. 10 Tab 11   • metFORMIN ER (GLUCOPHAGE XR) 500 MG TABLET SR 24 HR Take 1 Tab by mouth every day. 180 Tab 6     No current facility-administered medications for this visit.          Allergies as of 09/18/2019 - Reviewed 09/18/2019   Allergen Reaction Noted   • Food Anaphylaxis 05/22/2012        ROS: Denies Chest pain, SOB, LE edema.    /70 (BP Location: Left arm, Patient Position: Sitting)   Pulse 73   Temp 36.7 °C (98 °F)   Ht 1.753 m (5' 9\")   Wt 96.6 kg (213 lb)   SpO2 96%   BMI 31.45 kg/m²     Physical Exam:  Gen:         Alert and oriented, No apparent distress.  Neck:        No Lymphadenopathy or Bruits.  Lungs:     Clear to auscultation bilaterally  CV:          Regular rate and rhythm. No murmurs, rubs or gallops.               Ext:          No " clubbing, cyanosis, edema.      Assessment and Plan.   59 y.o. male with the following issues.    1. IGT (impaired glucose tolerance)  Clinically doing well continue metformin see back in 6 months  - POCT Hemoglobin A1C  - HEMOGLOBIN A1C; Future    2. Obesity (BMI 30-39.9)  Patient's body mass index is elevated. Exercise and nutrition counseling were performed at this visit.  Set goal of 15lbs in next 6 months.  - Patient identified as having weight management issue.  Appropriate orders and counseling given.    3. Lip lesion  Referring to ENT  - REFERRAL TO ENT    4. Screening PSA (prostate specific antigen)    - PROSTATE SPECIFIC AG SCREENING; Future    5. Screening for cardiovascular condition    - Comp Metabolic Panel; Future  - Lipid Profile; Future    6. Screen for colon cancer    - COLOGUARD (FIT DNA)  - REFERRAL TO GASTROENTEROLOGY    7. Need for vaccination    - Tdap Vaccine =>8YO IM

## 2019-11-11 DIAGNOSIS — R73.02 IGT (IMPAIRED GLUCOSE TOLERANCE): ICD-10-CM

## 2019-11-11 RX ORDER — METFORMIN HYDROCHLORIDE 500 MG/1
TABLET, EXTENDED RELEASE ORAL
Qty: 180 TAB | Refills: 3 | Status: SHIPPED | OUTPATIENT
Start: 2019-11-11

## 2020-01-14 ENCOUNTER — OFFICE VISIT (OUTPATIENT)
Dept: MEDICAL GROUP | Facility: MEDICAL CENTER | Age: 60
End: 2020-01-14
Payer: COMMERCIAL

## 2020-01-14 VITALS
HEIGHT: 69 IN | SYSTOLIC BLOOD PRESSURE: 112 MMHG | HEART RATE: 79 BPM | BODY MASS INDEX: 31.99 KG/M2 | OXYGEN SATURATION: 95 % | DIASTOLIC BLOOD PRESSURE: 72 MMHG | WEIGHT: 216 LBS | TEMPERATURE: 97.6 F

## 2020-01-14 DIAGNOSIS — F34.1 DYSTHYMIA: ICD-10-CM

## 2020-01-14 PROCEDURE — 99214 OFFICE O/P EST MOD 30 MIN: CPT | Performed by: INTERNAL MEDICINE

## 2020-01-14 RX ORDER — ESCITALOPRAM OXALATE 20 MG/1
20 TABLET ORAL DAILY
Qty: 90 TAB | Refills: 3 | Status: SHIPPED | OUTPATIENT
Start: 2020-01-14 | End: 2020-10-14

## 2020-01-14 ASSESSMENT — PATIENT HEALTH QUESTIONNAIRE - PHQ9
SUM OF ALL RESPONSES TO PHQ QUESTIONS 1-9: 4
5. POOR APPETITE OR OVEREATING: 1 - SEVERAL DAYS
CLINICAL INTERPRETATION OF PHQ2 SCORE: 1

## 2020-01-14 NOTE — PROGRESS NOTES
"      CC: Mood changes                                                                                                                                      HPI:   Gustavo presents today with the following.    1. Dysthymia  Presents reporting that he is become stressed and very agitated.  Actually quit his job to take some time off.  Denies major depressive symptoms but when screening for depression he answers mildly positive for all of them.  He is interested in seeking help in becoming more pleasant and enjoying life more.  He is never been on medications previously.  Denies any suicidal or homicidal ideation.      Patient Active Problem List    Diagnosis Date Noted   • Obesity (BMI 30-39.9) 09/18/2019   • Old complex tear of medial meniscus of right knee 11/19/2018   • Chronic pain of right knee 09/12/2018   • IGT (impaired glucose tolerance) 05/23/2018   • Premature ventricular contractions 01/09/2012   • JACOBO (nonalcoholic steatohepatitis) 11/09/2009       Current Outpatient Medications   Medication Sig Dispense Refill   • escitalopram (LEXAPRO) 20 MG tablet Take 1 Tab by mouth every day. 90 Tab 3   • metFORMIN ER (GLUCOPHAGE XR) 500 MG TABLET SR 24 HR TAKE 1 TABLET BY MOUTH ONCE DAILY 180 Tab 3   • sildenafil citrate (VIAGRA) 100 MG tablet Take 1 Tab by mouth every day. 10 Tab 11     No current facility-administered medications for this visit.          Allergies as of 01/14/2020 - Reviewed 01/14/2020   Allergen Reaction Noted   • Food Anaphylaxis 05/22/2012        ROS: Denies Chest pain, SOB, LE edema.    /72 (BP Location: Right arm, Patient Position: Sitting)   Pulse 79   Temp 36.4 °C (97.6 °F)   Ht 1.753 m (5' 9\")   Wt 98 kg (216 lb)   SpO2 95%   BMI 31.90 kg/m²     Physical Exam:  Gen:         Alert and oriented, No apparent distress.  Neck:        No Lymphadenopathy or Bruits.  Lungs:     Clear to auscultation bilaterally  CV:          Regular rate and rhythm. No murmurs, rubs or gallops.          "      Ext:          No clubbing, cyanosis, edema.      Assessment and Plan.   59 y.o. male with the following issues.    1. Dysthymia  Discussion about treatment of mood disorders and depression/mild anxiety starting on Lexapro 10 mg titrated up to 20.  Cautioned about side effects have referred to psychology as well.  We will see back in 2 months.  - REFERRAL TO PSYCHOLOGY

## 2020-07-07 ENCOUNTER — HOSPITAL ENCOUNTER (OUTPATIENT)
Facility: MEDICAL CENTER | Age: 60
End: 2020-07-07
Attending: PHYSICIAN ASSISTANT
Payer: COMMERCIAL

## 2020-07-07 ENCOUNTER — OFFICE VISIT (OUTPATIENT)
Dept: URGENT CARE | Facility: PHYSICIAN GROUP | Age: 60
End: 2020-07-07
Payer: COMMERCIAL

## 2020-07-07 VITALS
OXYGEN SATURATION: 96 % | RESPIRATION RATE: 16 BRPM | WEIGHT: 213 LBS | SYSTOLIC BLOOD PRESSURE: 122 MMHG | HEART RATE: 84 BPM | DIASTOLIC BLOOD PRESSURE: 68 MMHG | TEMPERATURE: 98.6 F | HEIGHT: 69 IN | BODY MASS INDEX: 31.55 KG/M2

## 2020-07-07 DIAGNOSIS — Z20.822 EXPOSURE TO COVID-19 VIRUS: ICD-10-CM

## 2020-07-07 DIAGNOSIS — J06.9 VIRAL URI: ICD-10-CM

## 2020-07-07 PROCEDURE — 99213 OFFICE O/P EST LOW 20 MIN: CPT | Mod: CS | Performed by: PHYSICIAN ASSISTANT

## 2020-07-07 PROCEDURE — U0003 INFECTIOUS AGENT DETECTION BY NUCLEIC ACID (DNA OR RNA); SEVERE ACUTE RESPIRATORY SYNDROME CORONAVIRUS 2 (SARS-COV-2) (CORONAVIRUS DISEASE [COVID-19]), AMPLIFIED PROBE TECHNIQUE, MAKING USE OF HIGH THROUGHPUT TECHNOLOGIES AS DESCRIBED BY CMS-2020-01-R: HCPCS

## 2020-07-07 ASSESSMENT — FIBROSIS 4 INDEX: FIB4 SCORE: 1.18

## 2020-07-07 NOTE — LETTER
July 7, 2020           To Whom it May Concern:    Gustavo Rm Jaime was seen in my clinic on 7/7/2020. He may return to work on 7/10/20.  If you have any questions or concerns, please don't hesitate to call.        Sincerely,           Niecy Ramirez P.A.-C.  Electronically Signed

## 2020-07-08 DIAGNOSIS — J06.9 VIRAL URI: ICD-10-CM

## 2020-07-08 DIAGNOSIS — Z20.822 EXPOSURE TO COVID-19 VIRUS: ICD-10-CM

## 2020-07-08 LAB — COVID ORDER STATUS COVID19: NORMAL

## 2020-07-09 LAB
SARS-COV-2 RNA RESP QL NAA+PROBE: NOTDETECTED
SPECIMEN SOURCE: NORMAL

## 2020-07-12 ASSESSMENT — ENCOUNTER SYMPTOMS
MYALGIAS: 0
COUGH: 1
SINUS PAIN: 0
WHEEZING: 0
DIZZINESS: 0
PALPITATIONS: 0
NECK PAIN: 0
VOMITING: 0
SHORTNESS OF BREATH: 0
NAUSEA: 0
SORE THROAT: 1
HEADACHES: 1
SPUTUM PRODUCTION: 0
EYE DISCHARGE: 0
EYE REDNESS: 0
CHILLS: 0
FEVER: 0
RHINORRHEA: 1
DIARRHEA: 0
ABDOMINAL PAIN: 0

## 2020-07-12 NOTE — PROGRESS NOTES
Subjective:      Gustavo Sandoval is a 59 y.o. male who presents with Sore Throat (cough, x 4 days)      URI    This is a new problem. The current episode started in the past 7 days (4 days). The problem has been gradually improving. There has been no fever. Associated symptoms include congestion, coughing, headaches, rhinorrhea and a sore throat. Pertinent negatives include no abdominal pain, chest pain, diarrhea, ear pain, nausea, neck pain, plugged ear sensation, rash, sinus pain, sneezing, vomiting or wheezing. Treatments tried: OTC cold/flu medications. The treatment provided mild relief.   Patient denies any chest pain, shortness of breath, lightheadedness/dizziness.  He has not had any contact with any person that has tested positive for COVID-19 virus.      Review of Systems   Constitutional: Negative for chills, fever and malaise/fatigue.   HENT: Positive for congestion, rhinorrhea and sore throat. Negative for ear pain, sinus pain and sneezing.    Eyes: Negative for discharge and redness.   Respiratory: Positive for cough. Negative for sputum production, shortness of breath and wheezing.    Cardiovascular: Negative for chest pain and palpitations.   Gastrointestinal: Negative for abdominal pain, diarrhea, nausea and vomiting.   Musculoskeletal: Negative for myalgias and neck pain.   Skin: Negative for rash.   Neurological: Positive for headaches. Negative for dizziness.       PMH:  has a past medical history of Diabetes (HCC), Heart burn, Pain, and Thumb pain (2012).  MEDS:   Current Outpatient Medications:   •  escitalopram (LEXAPRO) 20 MG tablet, Take 1 Tab by mouth every day., Disp: 90 Tab, Rfl: 3  •  metFORMIN ER (GLUCOPHAGE XR) 500 MG TABLET SR 24 HR, TAKE 1 TABLET BY MOUTH ONCE DAILY, Disp: 180 Tab, Rfl: 3  •  sildenafil citrate (VIAGRA) 100 MG tablet, Take 1 Tab by mouth every day. (Patient not taking: Reported on 7/7/2020), Disp: 10 Tab, Rfl: 11  ALLERGIES:   Allergies   Allergen Reactions  "  • Food Anaphylaxis     Pistachio nuts, peanuts      SURGHX:   Past Surgical History:   Procedure Laterality Date   • KNEE ARTHROSCOPY Right 11/19/2018    Procedure: KNEE ARTHROSCOPY;  Surgeon: Russel Corona M.D.;  Location: SURGERY [facility];  Service: Orthopedics   • MEDIAL MENISCECTOMY  11/19/2018    Procedure: MEDIAL MENISCECTOMY - PARTIAL;  Surgeon: Russel Corona M.D.;  Location: SURGERY [facility];  Service: Orthopedics   • CHONDROPLASTY Right 11/19/2018    Procedure: CHONDROPLASTY;  Surgeon: Russel Corona M.D.;  Location: SURGERY [facility];  Service: Orthopedics   • TRIGGER FINGER RELEASE  5/29/2012    Performed by GIGI PLUMMER at [facility]   • HERNIA REPAIR  2006   • CHOLECYSTECTOMY  2004   • LUMBAR DECOMPRESSION  1992   • WRIST EXPLORATION  1986   • CARLOS BY LAPAROSCOPY     • LAMINOTOMY       SOCHX:  reports that he has never smoked. He has never used smokeless tobacco. He reports that he does not drink alcohol or use drugs.  FH: Family history was reviewed, no pertinent findings to report     Objective:     /68 (BP Location: Left arm, Patient Position: Sitting, BP Cuff Size: Adult)   Pulse 84   Temp 37 °C (98.6 °F) (Temporal)   Resp 16   Ht 1.753 m (5' 9\")   Wt 96.6 kg (213 lb)   SpO2 96%   BMI 31.45 kg/m²      Physical Exam  Constitutional:       Appearance: He is well-developed.   HENT:      Head: Normocephalic and atraumatic.      Right Ear: Tympanic membrane, ear canal and external ear normal.      Left Ear: Tympanic membrane, ear canal and external ear normal.      Nose: Mucosal edema, congestion and rhinorrhea present.      Right Sinus: No maxillary sinus tenderness or frontal sinus tenderness.      Left Sinus: No maxillary sinus tenderness or frontal sinus tenderness.      Mouth/Throat:      Lips: Pink.      Mouth: Mucous membranes are moist.      Pharynx: Oropharynx is clear.   Eyes:      Conjunctiva/sclera: Conjunctivae normal.      " Pupils: Pupils are equal, round, and reactive to light.   Neck:      Musculoskeletal: Normal range of motion.   Cardiovascular:      Rate and Rhythm: Normal rate and regular rhythm.      Heart sounds: Normal heart sounds. No murmur.   Pulmonary:      Effort: Pulmonary effort is normal.      Breath sounds: Normal breath sounds. No decreased breath sounds, wheezing, rhonchi or rales.   Lymphadenopathy:      Cervical: No cervical adenopathy.   Skin:     General: Skin is warm and dry.      Capillary Refill: Capillary refill takes less than 2 seconds.   Neurological:      Mental Status: He is alert and oriented to person, place, and time.   Psychiatric:         Behavior: Behavior normal.         Judgment: Judgment normal.            Assessment/Plan:       1. Viral URI  - COVID/SARS COV-2 PCR; Future  - OTC cold/flu medications  - PO fluids  - Rest  - Tylenol as needed for fever > 100.4 F    2. Exposure to COVID-19 virus  - COVID/SARS COV-2 PCR; Future        *Patient had a nasal swab to test for COVID-19 virus.  Patient was advised to stay home and self isolate/self quarantine while awaiting the results.  Supportive care was reiterated.  Return/ER precautions discussed.

## 2020-09-15 ENCOUNTER — HOSPITAL ENCOUNTER (OUTPATIENT)
Dept: LAB | Facility: MEDICAL CENTER | Age: 60
End: 2020-09-15
Attending: INTERNAL MEDICINE
Payer: COMMERCIAL

## 2020-09-15 DIAGNOSIS — Z12.5 SCREENING PSA (PROSTATE SPECIFIC ANTIGEN): ICD-10-CM

## 2020-09-15 DIAGNOSIS — R73.02 IGT (IMPAIRED GLUCOSE TOLERANCE): ICD-10-CM

## 2020-09-15 DIAGNOSIS — Z13.6 SCREENING FOR CARDIOVASCULAR CONDITION: ICD-10-CM

## 2020-09-15 LAB
ALBUMIN SERPL BCP-MCNC: 4.6 G/DL (ref 3.2–4.9)
ALBUMIN/GLOB SERPL: 1.5 G/DL
ALP SERPL-CCNC: 68 U/L (ref 30–99)
ALT SERPL-CCNC: 69 U/L (ref 2–50)
ANION GAP SERPL CALC-SCNC: 9 MMOL/L (ref 7–16)
AST SERPL-CCNC: 30 U/L (ref 12–45)
BILIRUB SERPL-MCNC: 1 MG/DL (ref 0.1–1.5)
BUN SERPL-MCNC: 10 MG/DL (ref 8–22)
CALCIUM SERPL-MCNC: 9.5 MG/DL (ref 8.5–10.5)
CHLORIDE SERPL-SCNC: 95 MMOL/L (ref 96–112)
CHOLEST SERPL-MCNC: 88 MG/DL (ref 100–199)
CO2 SERPL-SCNC: 24 MMOL/L (ref 20–33)
CREAT SERPL-MCNC: 0.68 MG/DL (ref 0.5–1.4)
EST. AVERAGE GLUCOSE BLD GHB EST-MCNC: 131 MG/DL
FASTING STATUS PATIENT QL REPORTED: NORMAL
GLOBULIN SER CALC-MCNC: 3 G/DL (ref 1.9–3.5)
GLUCOSE SERPL-MCNC: 104 MG/DL (ref 65–99)
HBA1C MFR BLD: 6.2 % (ref 0–5.6)
HDLC SERPL-MCNC: 52 MG/DL
LDLC SERPL CALC-MCNC: 30 MG/DL
POTASSIUM SERPL-SCNC: 4.3 MMOL/L (ref 3.6–5.5)
PROT SERPL-MCNC: 7.6 G/DL (ref 6–8.2)
PSA SERPL-MCNC: 1.49 NG/ML (ref 0–4)
SODIUM SERPL-SCNC: 128 MMOL/L (ref 135–145)
TRIGL SERPL-MCNC: 32 MG/DL (ref 0–149)

## 2020-09-15 PROCEDURE — 80061 LIPID PANEL: CPT

## 2020-09-15 PROCEDURE — 36415 COLL VENOUS BLD VENIPUNCTURE: CPT

## 2020-09-15 PROCEDURE — 83036 HEMOGLOBIN GLYCOSYLATED A1C: CPT

## 2020-09-15 PROCEDURE — 84153 ASSAY OF PSA TOTAL: CPT

## 2020-09-15 PROCEDURE — 80053 COMPREHEN METABOLIC PANEL: CPT

## 2020-09-23 ENCOUNTER — OFFICE VISIT (OUTPATIENT)
Dept: MEDICAL GROUP | Facility: MEDICAL CENTER | Age: 60
End: 2020-09-23
Payer: COMMERCIAL

## 2020-09-23 VITALS
DIASTOLIC BLOOD PRESSURE: 80 MMHG | OXYGEN SATURATION: 95 % | WEIGHT: 219 LBS | SYSTOLIC BLOOD PRESSURE: 124 MMHG | HEART RATE: 88 BPM | TEMPERATURE: 97.6 F | BODY MASS INDEX: 32.44 KG/M2 | HEIGHT: 69 IN

## 2020-09-23 DIAGNOSIS — Z23 NEED FOR VACCINATION: ICD-10-CM

## 2020-09-23 DIAGNOSIS — R73.02 IGT (IMPAIRED GLUCOSE TOLERANCE): ICD-10-CM

## 2020-09-23 DIAGNOSIS — E87.1 HYPONATREMIA: ICD-10-CM

## 2020-09-23 DIAGNOSIS — K75.81 NASH (NONALCOHOLIC STEATOHEPATITIS): ICD-10-CM

## 2020-09-23 PROCEDURE — 99214 OFFICE O/P EST MOD 30 MIN: CPT | Mod: 25 | Performed by: INTERNAL MEDICINE

## 2020-09-23 PROCEDURE — 90471 IMMUNIZATION ADMIN: CPT | Performed by: INTERNAL MEDICINE

## 2020-09-23 PROCEDURE — 90686 IIV4 VACC NO PRSV 0.5 ML IM: CPT | Performed by: INTERNAL MEDICINE

## 2020-09-23 ASSESSMENT — FIBROSIS 4 INDEX: FIB4 SCORE: 1.02

## 2020-09-23 NOTE — PROGRESS NOTES
"      CC: Hyponatremia, abnormal liver tests, elevated blood sugar                                                                                                                                      HPI:   Gustavo presents today with the following.    1. Hyponatremia  Presents after being started on SSRI with improved mood however sodium which is always been normal now low at 128.  Denies excessive thirst or drinking large amounts of water.    2. JACOBO (nonalcoholic steatohepatitis)  LFT abnormality consistent over the years with an ultrasound showing fatty liver disease no increase in liver numbers.  He has not lost weight    3. IGT (impaired glucose tolerance)  A1c found to be 6.2 from 6.3 previously denying hypoglycemia tolerating metformin without diarrhea    4. Need for vaccination        Patient Active Problem List    Diagnosis Date Noted   • Obesity (BMI 30-39.9) 09/18/2019   • Old complex tear of medial meniscus of right knee 11/19/2018   • Chronic pain of right knee 09/12/2018   • IGT (impaired glucose tolerance) 05/23/2018   • Premature ventricular contractions 01/09/2012   • JACOBO (nonalcoholic steatohepatitis) 11/09/2009       Current Outpatient Medications   Medication Sig Dispense Refill   • escitalopram (LEXAPRO) 20 MG tablet Take 1 Tab by mouth every day. 90 Tab 3   • metFORMIN ER (GLUCOPHAGE XR) 500 MG TABLET SR 24 HR TAKE 1 TABLET BY MOUTH ONCE DAILY 180 Tab 3   • sildenafil citrate (VIAGRA) 100 MG tablet Take 1 Tab by mouth every day. (Patient not taking: Reported on 7/7/2020) 10 Tab 11     No current facility-administered medications for this visit.          Allergies as of 09/23/2020 - Reviewed 07/12/2020   Allergen Reaction Noted   • Food Anaphylaxis 05/22/2012        ROS: Denies Chest pain, SOB, LE edema.    /80 (BP Location: Left arm, Patient Position: Sitting)   Pulse 88   Temp 36.4 °C (97.6 °F)   Ht 1.753 m (5' 9\")   Wt 99.3 kg (219 lb)   SpO2 95%   BMI 32.34 kg/m²     Physical " Exam:  Gen:         Alert and oriented, No apparent distress.  Neck:        No Lymphadenopathy or Bruits.  Lungs:     Clear to auscultation bilaterally  CV:          Regular rate and rhythm. No murmurs, rubs or gallops.               Ext:          No clubbing, cyanosis, edema.      Assessment and Plan.   60 y.o. male with the following issues.    1. Hyponatremia  Sodium 128 concerning enough to titrate off of Lexapro.  Will check sodium 10 days afterwards.  We will follow-up in 3 weeks to check on mood and sure sodium is normalized  - Basic Metabolic Panel; Future    2. JACOBO (nonalcoholic steatohepatitis)  Consistent with Jacobo suggestions for weight loss    3. IGT (impaired glucose tolerance)  Stable recheck 6 months    4. Need for vaccination    - Influenza Vaccine Quad Injection (PF)

## 2020-10-13 ENCOUNTER — HOSPITAL ENCOUNTER (OUTPATIENT)
Dept: LAB | Facility: MEDICAL CENTER | Age: 60
End: 2020-10-13
Attending: INTERNAL MEDICINE
Payer: COMMERCIAL

## 2020-10-13 DIAGNOSIS — E87.1 HYPONATREMIA: ICD-10-CM

## 2020-10-13 LAB
ANION GAP SERPL CALC-SCNC: 11 MMOL/L (ref 7–16)
BUN SERPL-MCNC: 13 MG/DL (ref 8–22)
CALCIUM SERPL-MCNC: 9.3 MG/DL (ref 8.5–10.5)
CHLORIDE SERPL-SCNC: 103 MMOL/L (ref 96–112)
CO2 SERPL-SCNC: 25 MMOL/L (ref 20–33)
CREAT SERPL-MCNC: 0.79 MG/DL (ref 0.5–1.4)
FASTING STATUS PATIENT QL REPORTED: NORMAL
GLUCOSE SERPL-MCNC: 118 MG/DL (ref 65–99)
POTASSIUM SERPL-SCNC: 4.6 MMOL/L (ref 3.6–5.5)
SODIUM SERPL-SCNC: 139 MMOL/L (ref 135–145)

## 2020-10-13 PROCEDURE — 36415 COLL VENOUS BLD VENIPUNCTURE: CPT

## 2020-10-13 PROCEDURE — 80048 BASIC METABOLIC PNL TOTAL CA: CPT

## 2020-10-14 ENCOUNTER — OFFICE VISIT (OUTPATIENT)
Dept: MEDICAL GROUP | Facility: MEDICAL CENTER | Age: 60
End: 2020-10-14
Payer: COMMERCIAL

## 2020-10-14 VITALS
TEMPERATURE: 97.6 F | DIASTOLIC BLOOD PRESSURE: 70 MMHG | BODY MASS INDEX: 32.73 KG/M2 | OXYGEN SATURATION: 96 % | HEART RATE: 73 BPM | SYSTOLIC BLOOD PRESSURE: 138 MMHG | HEIGHT: 69 IN | WEIGHT: 221 LBS

## 2020-10-14 DIAGNOSIS — E87.1 HYPONATREMIA: ICD-10-CM

## 2020-10-14 DIAGNOSIS — N52.9 ERECTILE DYSFUNCTION, UNSPECIFIED ERECTILE DYSFUNCTION TYPE: ICD-10-CM

## 2020-10-14 DIAGNOSIS — F51.01 PRIMARY INSOMNIA: ICD-10-CM

## 2020-10-14 PROCEDURE — 99214 OFFICE O/P EST MOD 30 MIN: CPT | Performed by: INTERNAL MEDICINE

## 2020-10-14 RX ORDER — MIRTAZAPINE 15 MG/1
15 TABLET, FILM COATED ORAL
Qty: 90 TAB | Refills: 3 | Status: SHIPPED | OUTPATIENT
Start: 2020-10-14 | End: 2023-12-08

## 2020-10-14 RX ORDER — SILDENAFIL 100 MG/1
100 TABLET, FILM COATED ORAL DAILY
Qty: 10 TAB | Refills: 11 | Status: SHIPPED | OUTPATIENT
Start: 2020-10-14

## 2020-10-14 ASSESSMENT — FIBROSIS 4 INDEX: FIB4 SCORE: 1.02

## 2020-10-14 NOTE — PROGRESS NOTES
"      CC: Follow-up hyponatremia, mood disturbance, erectile issues.                                                                                                                                      HPI:   Gustavo presents today with the following.    1. Hyponatremia  Presents after starting on Lexapro recently sodium went down to 128.  Discontinue medication recheck of sodium is now normalized.  Chloride was also off again has normalized.    2. Primary insomnia  Reports his mood is worse coming off medications he is having difficulty sleeping as well.  He like to be on another medication but realizes he cannot be on the similar class.    3. Erectile dysfunction, unspecified erectile dysfunction type  Have erectile issues and requesting refill of Viagra.      Patient Active Problem List    Diagnosis Date Noted   • Erectile dysfunction 10/14/2020   • Obesity (BMI 30-39.9) 09/18/2019   • Old complex tear of medial meniscus of right knee 11/19/2018   • Chronic pain of right knee 09/12/2018   • IGT (impaired glucose tolerance) 05/23/2018   • Premature ventricular contractions 01/09/2012   • JACOBO (nonalcoholic steatohepatitis) 11/09/2009       Current Outpatient Medications   Medication Sig Dispense Refill   • mirtazapine (REMERON) 15 MG Tab Take 1 Tab by mouth every bedtime. 90 Tab 3   • sildenafil citrate (VIAGRA) 100 MG tablet Take 1 Tab by mouth every day. 10 Tab 11   • metFORMIN ER (GLUCOPHAGE XR) 500 MG TABLET SR 24 HR TAKE 1 TABLET BY MOUTH ONCE DAILY 180 Tab 3     No current facility-administered medications for this visit.          Allergies as of 10/14/2020 - Reviewed 10/14/2020   Allergen Reaction Noted   • Food Anaphylaxis 05/22/2012   • Lexapro  10/14/2020        ROS: Denies Chest pain, SOB, LE edema.    /70 (BP Location: Right arm, Patient Position: Sitting)   Pulse 73   Temp 36.4 °C (97.6 °F)   Ht 1.753 m (5' 9\")   Wt 100.2 kg (221 lb)   SpO2 96%   BMI 32.64 kg/m²     Physical Exam:  Gen:      "    Alert and oriented, No apparent distress.  Neck:        No Lymphadenopathy or Bruits.  Lungs:     Clear to auscultation bilaterally  CV:          Regular rate and rhythm. No murmurs, rubs or gallops.               Ext:          No clubbing, cyanosis, edema.      Assessment and Plan.   60 y.o. male with the following issues.    1. Hyponatremia  Consider related to SSRI avoiding that class of medications in the future.    2. Primary insomnia  Starting on Remeron given a prescription for 15 mg tablets.  He will start with a half a pill.  Have given permission to self titrate up to 30 mg if needed.  If 2 pills is effective without side effects he will send a Recorded Future message to hopefully get the prescription changed by covering provider without being seen immediately.    3. Erectile dysfunction, unspecified erectile dysfunction type  Refilled Viagra

## 2021-03-15 DIAGNOSIS — Z23 NEED FOR VACCINATION: ICD-10-CM

## 2023-09-11 ENCOUNTER — HOSPITAL ENCOUNTER (OUTPATIENT)
Dept: RADIOLOGY | Facility: MEDICAL CENTER | Age: 63
End: 2023-09-11
Attending: STUDENT IN AN ORGANIZED HEALTH CARE EDUCATION/TRAINING PROGRAM
Payer: COMMERCIAL

## 2023-09-11 DIAGNOSIS — M75.100: ICD-10-CM

## 2023-09-11 DIAGNOSIS — M75.111 INCOMPLETE TEAR OF RIGHT ROTATOR CUFF, UNSPECIFIED WHETHER TRAUMATIC: ICD-10-CM

## 2023-09-11 DIAGNOSIS — R29.898 DEFICIENCIES OF LIMBS: ICD-10-CM

## 2023-09-11 PROCEDURE — 73221 MRI JOINT UPR EXTREM W/O DYE: CPT | Mod: RT

## 2023-09-29 PROBLEM — M75.41 SUBACROMIAL IMPINGEMENT OF RIGHT SHOULDER: Status: ACTIVE | Noted: 2023-09-29

## 2023-09-29 PROBLEM — S43.431A LABRAL TEAR OF SHOULDER, RIGHT, INITIAL ENCOUNTER: Status: ACTIVE | Noted: 2023-09-29

## 2023-09-29 PROBLEM — M75.121 COMPLETE ROTATOR CUFF TEAR OR RUPTURE OF RIGHT SHOULDER, NOT SPECIFIED AS TRAUMATIC: Status: ACTIVE | Noted: 2023-09-29

## 2023-09-29 PROBLEM — M75.21 BICEPS TENDINITIS OF RIGHT SHOULDER: Status: ACTIVE | Noted: 2023-09-29

## 2023-12-07 PROBLEM — E11.9 TYPE 2 DIABETES MELLITUS, WITHOUT LONG-TERM CURRENT USE OF INSULIN (HCC): Status: ACTIVE | Noted: 2023-12-07

## 2025-04-11 ENCOUNTER — APPOINTMENT (OUTPATIENT)
Dept: URBAN - METROPOLITAN AREA CLINIC 31 | Facility: CLINIC | Age: 65
Setting detail: DERMATOLOGY
End: 2025-04-11

## 2025-04-11 ENCOUNTER — APPOINTMENT (OUTPATIENT)
Dept: URBAN - METROPOLITAN AREA CLINIC 4 | Facility: CLINIC | Age: 65
Setting detail: DERMATOLOGY
End: 2025-04-11

## 2025-04-11 DIAGNOSIS — L82.0 INFLAMED SEBORRHEIC KERATOSIS: ICD-10-CM | Status: INADEQUATELY CONTROLLED

## 2025-04-11 DIAGNOSIS — L82.0 INFLAMED SEBORRHEIC KERATOSIS: ICD-10-CM

## 2025-04-11 DIAGNOSIS — L28.0 LICHEN SIMPLEX CHRONICUS: ICD-10-CM

## 2025-04-11 PROCEDURE — ? LIQUID NITROGEN

## 2025-04-11 PROCEDURE — ? COUNSELING

## 2025-04-11 PROCEDURE — 17110 DESTRUCTION B9 LES UP TO 14: CPT

## 2025-04-11 PROCEDURE — 99202 OFFICE O/P NEW SF 15 MIN: CPT | Mod: 25

## 2025-04-11 ASSESSMENT — LOCATION SIMPLE DESCRIPTION DERM
LOCATION SIMPLE: RIGHT CHEEK
LOCATION SIMPLE: LEFT UPPER ARM
LOCATION SIMPLE: RIGHT UPPER ARM
LOCATION SIMPLE: RIGHT CHEEK
LOCATION SIMPLE: LEFT UPPER ARM
LOCATION SIMPLE: RIGHT UPPER ARM

## 2025-04-11 ASSESSMENT — LOCATION DETAILED DESCRIPTION DERM
LOCATION DETAILED: LEFT PROXIMAL POSTERIOR UPPER ARM
LOCATION DETAILED: RIGHT SUPERIOR CENTRAL MALAR CHEEK
LOCATION DETAILED: RIGHT SUPERIOR CENTRAL MALAR CHEEK
LOCATION DETAILED: RIGHT DISTAL POSTERIOR UPPER ARM
LOCATION DETAILED: LEFT PROXIMAL POSTERIOR UPPER ARM
LOCATION DETAILED: RIGHT PROXIMAL POSTERIOR UPPER ARM
LOCATION DETAILED: LEFT DISTAL POSTERIOR UPPER ARM
LOCATION DETAILED: LEFT DISTAL POSTERIOR UPPER ARM
LOCATION DETAILED: RIGHT PROXIMAL POSTERIOR UPPER ARM
LOCATION DETAILED: RIGHT DISTAL POSTERIOR UPPER ARM

## 2025-04-11 ASSESSMENT — LOCATION ZONE DERM
LOCATION ZONE: ARM
LOCATION ZONE: ARM
LOCATION ZONE: FACE
LOCATION ZONE: FACE

## (undated) DEVICE — CANISTER SUCTION 3000ML MECHANICAL FILTER AUTO SHUTOFF MEDI-VAC NONSTERILE LF DISP  (40EA/CA)

## (undated) DEVICE — DRESSING 3X8 ADAPTIC GAUZE - NON-ADHERING (36/PK 6PK/BX)

## (undated) DEVICE — HEAD HOLDER JUNIOR/ADULT

## (undated) DEVICE — MASK, LARYNGEAL AIRWAY #5

## (undated) DEVICE — Device

## (undated) DEVICE — SET LEADWIRE 5 LEAD BEDSIDE DISPOSABLE ECG (1SET OF 5/EA)

## (undated) DEVICE — LACTATED RINGERS INJ 1000 ML - (14EA/CA 60CA/PF)

## (undated) DEVICE — SLEEVE, VASO, THIGH, MED

## (undated) DEVICE — SET EXTENSION WITH 2 PORTS (48EA/CA) ***PART #2C8610 IS A SUBSTITUTE*****

## (undated) DEVICE — TUBING IRR SET CSST SPK LN LL - (3EA/CA ORDER BY CASE!!!!)

## (undated) DEVICE — SUCTION INSTRUMENT YANKAUER BULBOUS TIP W/O VENT (50EA/CA)

## (undated) DEVICE — CANISTER SUCTION RIGID 16000 - CC OMNI-JUG (4EA/CA)

## (undated) DEVICE — DRAPE LOWER EXTREMETY - (6/CA)

## (undated) DEVICE — GLOVE BIOGEL ECLIPSE PF LATEX SIZE 8.0  (50PR/BX)

## (undated) DEVICE — SUTURE GENERAL

## (undated) DEVICE — CHLORAPREP 26 ML APPLICATOR - ORANGE TINT(25/CA)

## (undated) DEVICE — MASK ANESTHESIA ADULT  - (100/CA)

## (undated) DEVICE — BANDAGE ELASTIC 6 HONEYCOMB - 6X5YD LF (20/CA)"

## (undated) DEVICE — SODIUM CHL. IRRIGATION 0.9% 3000ML (4EA/CA 65CA/PF)

## (undated) DEVICE — DRAPE LARGE 3 QUARTER - (20/CA)

## (undated) DEVICE — DRAPE LASER ARM CAMERA - 7 X 96 (25EA/BX)

## (undated) DEVICE — GLOVE BIOGEL ECLIPSE PF LATEX SIZE 8.5 (50PR/BX)

## (undated) DEVICE — NEPTUNE 4 PORT MANIFOLD - (20/PK)

## (undated) DEVICE — PROTECTOR ULNA NERVE - (36PR/CA)

## (undated) DEVICE — KIT ANESTHESIA W/CIRCUIT & 3/LT BAG W/FILTER (20EA/CA)

## (undated) DEVICE — SENSOR SPO2 NEO LNCS ADHESIVE (20/BX) SEE USER NOTES

## (undated) DEVICE — TOWELS CLOTH SURGICAL - (4/PK 20PK/CA)

## (undated) DEVICE — SUTURE 3-0 ETHILON PS-1 (36PK/BX)

## (undated) DEVICE — ELECTRODE 850 FOAM ADHESIVE - HYDROGEL RADIOTRNSPRNT (50/PK)

## (undated) DEVICE — SODIUM CHL IRRIGATION 0.9% 1000ML (12EA/CA)

## (undated) DEVICE — GOWN WARMING STANDARD FLEX - (30/CA)

## (undated) DEVICE — NEEDLE SAFETY 18 GA X 1 1/2 IN (100EA/BX)

## (undated) DEVICE — PROBESUCTION  3.5MM 90IN SERFAS ACCELERATOR

## (undated) DEVICE — PACK ARTHROSCOPY - (2EA//CA)

## (undated) DEVICE — KIT ROOM DECONTAMINATION

## (undated) DEVICE — TUBING CLEARLINK DUO-VENT - C-FLO (48EA/CA)